# Patient Record
(demographics unavailable — no encounter records)

---

## 2025-01-02 NOTE — HISTORY OF PRESENT ILLNESS
[FreeTextEntry1] : 43F with severe eczema.  HIV since  (age 17).  Currently well controlled on Descovy/tivikay, reyetaz/r.  Hx AZT, Susiva, prezista (did not tolerate), kaletra.  In the past, she had break through viremia () and genosure was performed.  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  PET 2018 (-).  Has not had f/u.  Depression on Lexapro.   Adherent on ARV.   Not missing doses.    ROS:  recent eye infection s/p ciprofloxacin drops.  Having hot flashes.  LMP dec 9.  Had mammogram and calcifications noted on R outer breast.  But she used deodorant.   She has to repeat R diagnostic mammo with ultrasound next week.  ROS otherwise (-).    Cousin  aged 32 - complications of sepsis from pyelo Father diagnosed with prostate cancer  PMH/PSH: HIV shingles eczema HPV candida carpel tunnel asthma mitral regurgitation  MEDS: singulair descovy/reyataz/norvir/tivicay antihistamines SSRI

## 2025-01-02 NOTE — REVIEW OF SYSTEMS
[As Noted in HPI] : as noted in HPI [Negative] : Genitourinary [___ # of Missed Doses in The Past Week] : [unfilled] doses missed in the past week  [de-identified] : eczema [de-identified] : depression is better

## 2025-01-02 NOTE — REVIEW OF SYSTEMS
[As Noted in HPI] : as noted in HPI [Negative] : Genitourinary [___ # of Missed Doses in The Past Week] : [unfilled] doses missed in the past week  [de-identified] : eczema [de-identified] : depression is better

## 2025-01-02 NOTE — REVIEW OF SYSTEMS
[As Noted in HPI] : as noted in HPI [Negative] : Genitourinary [___ # of Missed Doses in The Past Week] : [unfilled] doses missed in the past week  [de-identified] : eczema [de-identified] : depression is better

## 2025-01-02 NOTE — PHYSICAL EXAM
[General Appearance - In No Acute Distress] : in no acute distress [Sclera] : the sclera and conjunctiva were normal [Outer Ear] : the ears and nose were normal in appearance [Neck Appearance] : the appearance of the neck was normal [] : the neck was supple [Auscultation Breath Sounds / Voice Sounds] : lungs were clear to auscultation bilaterally [Heart Sounds] : normal S1 and S2 [Edema] : there was no peripheral edema [Abdomen Tenderness] : non-tender [No Palpable Adenopathy] : no palpable adenopathy [Musculoskeletal - Swelling] : no joint swelling [No Focal Deficits] : no focal deficits [Affect] : the affect was normal [FreeTextEntry1] : eczema

## 2025-01-02 NOTE — ASSESSMENT
[FreeTextEntry1] : 43F with HIV/AIDS controlled on arv, eczema/asthma, hx clara morley, echo did not see mitral regurgitation, depression on lexapro.  Weight gain over the holidays  HIV - continue Descovy, Tivikay, atazanivir/norvir - check labs today  eczema - follows with [facility] - prn steroids topically  Low vitamin D - on vitamin D supplement - repeat level today  Asthma - prn inhaler - singulair  murmur - last echo: during pregnancy - f/u PCP re need for f/u echo  Kari - hematology prn  Depression - continue lexapro 15mg daily  HCM/vaccination - Dr. Magana - mammogram done (Nov 2024), repeat next mammo/sono - will f/u with GYN - she will discuss weygovy with him  f/u 6 months